# Patient Record
Sex: FEMALE | Race: WHITE | NOT HISPANIC OR LATINO | Employment: OTHER | URBAN - METROPOLITAN AREA
[De-identification: names, ages, dates, MRNs, and addresses within clinical notes are randomized per-mention and may not be internally consistent; named-entity substitution may affect disease eponyms.]

---

## 2017-01-19 ENCOUNTER — RECORDS - HEALTHEAST (OUTPATIENT)
Dept: LAB | Facility: CLINIC | Age: 77
End: 2017-01-19

## 2017-01-19 LAB
CHOLEST SERPL-MCNC: 271 MG/DL
FASTING STATUS PATIENT QL REPORTED: ABNORMAL
HDLC SERPL-MCNC: 82 MG/DL
LDLC SERPL CALC-MCNC: 179 MG/DL
TRIGL SERPL-MCNC: 50 MG/DL

## 2018-01-25 ENCOUNTER — RECORDS - HEALTHEAST (OUTPATIENT)
Dept: LAB | Facility: CLINIC | Age: 78
End: 2018-01-25

## 2018-01-25 LAB
CHOLEST SERPL-MCNC: 258 MG/DL
FASTING STATUS PATIENT QL REPORTED: ABNORMAL
HDLC SERPL-MCNC: 83 MG/DL
LDLC SERPL CALC-MCNC: 163 MG/DL
TRIGL SERPL-MCNC: 60 MG/DL

## 2018-01-26 LAB — 25(OH)D3 SERPL-MCNC: 58.6 NG/ML (ref 30–80)

## 2019-02-28 ENCOUNTER — RECORDS - HEALTHEAST (OUTPATIENT)
Dept: LAB | Facility: CLINIC | Age: 79
End: 2019-02-28

## 2019-02-28 LAB
CHOLEST SERPL-MCNC: 228 MG/DL
FASTING STATUS PATIENT QL REPORTED: ABNORMAL
HDLC SERPL-MCNC: 68 MG/DL
LDLC SERPL CALC-MCNC: 145 MG/DL
TRIGL SERPL-MCNC: 73 MG/DL

## 2019-03-01 LAB — 25(OH)D3 SERPL-MCNC: 58.6 NG/ML (ref 30–80)

## 2019-11-11 ENCOUNTER — RECORDS - HEALTHEAST (OUTPATIENT)
Dept: ADMINISTRATIVE | Facility: OTHER | Age: 79
End: 2019-11-11

## 2020-08-06 ENCOUNTER — RECORDS - HEALTHEAST (OUTPATIENT)
Dept: LAB | Facility: CLINIC | Age: 80
End: 2020-08-06

## 2020-08-07 LAB — 25(OH)D3 SERPL-MCNC: 49.1 NG/ML (ref 30–80)

## 2021-05-25 ENCOUNTER — RECORDS - HEALTHEAST (OUTPATIENT)
Dept: ADMINISTRATIVE | Facility: CLINIC | Age: 81
End: 2021-05-25

## 2021-05-26 ENCOUNTER — RECORDS - HEALTHEAST (OUTPATIENT)
Dept: ADMINISTRATIVE | Facility: CLINIC | Age: 81
End: 2021-05-26

## 2021-05-27 ENCOUNTER — RECORDS - HEALTHEAST (OUTPATIENT)
Dept: ADMINISTRATIVE | Facility: CLINIC | Age: 81
End: 2021-05-27

## 2021-05-28 ENCOUNTER — RECORDS - HEALTHEAST (OUTPATIENT)
Dept: ADMINISTRATIVE | Facility: CLINIC | Age: 81
End: 2021-05-28

## 2021-05-31 ENCOUNTER — RECORDS - HEALTHEAST (OUTPATIENT)
Dept: ADMINISTRATIVE | Facility: CLINIC | Age: 81
End: 2021-05-31

## 2021-06-01 ENCOUNTER — RECORDS - HEALTHEAST (OUTPATIENT)
Dept: ADMINISTRATIVE | Facility: CLINIC | Age: 81
End: 2021-06-01

## 2021-06-02 ENCOUNTER — RECORDS - HEALTHEAST (OUTPATIENT)
Dept: ADMINISTRATIVE | Facility: CLINIC | Age: 81
End: 2021-06-02

## 2021-07-13 ENCOUNTER — RECORDS - HEALTHEAST (OUTPATIENT)
Dept: ADMINISTRATIVE | Facility: CLINIC | Age: 81
End: 2021-07-13

## 2021-07-21 ENCOUNTER — RECORDS - HEALTHEAST (OUTPATIENT)
Dept: ADMINISTRATIVE | Facility: CLINIC | Age: 81
End: 2021-07-21

## 2021-07-26 ENCOUNTER — LAB REQUISITION (OUTPATIENT)
Dept: LAB | Facility: CLINIC | Age: 81
End: 2021-07-26
Payer: COMMERCIAL

## 2021-07-26 DIAGNOSIS — R05.9 COUGH: ICD-10-CM

## 2021-07-26 LAB — SARS-COV-2 RNA RESP QL NAA+PROBE: NEGATIVE

## 2021-07-26 PROCEDURE — U0003 INFECTIOUS AGENT DETECTION BY NUCLEIC ACID (DNA OR RNA); SEVERE ACUTE RESPIRATORY SYNDROME CORONAVIRUS 2 (SARS-COV-2) (CORONAVIRUS DISEASE [COVID-19]), AMPLIFIED PROBE TECHNIQUE, MAKING USE OF HIGH THROUGHPUT TECHNOLOGIES AS DESCRIBED BY CMS-2020-01-R: HCPCS | Mod: ORL | Performed by: PHYSICIAN ASSISTANT

## 2021-10-07 ENCOUNTER — LAB REQUISITION (OUTPATIENT)
Dept: LAB | Facility: CLINIC | Age: 81
End: 2021-10-07

## 2021-10-07 DIAGNOSIS — E55.9 VITAMIN D DEFICIENCY, UNSPECIFIED: ICD-10-CM

## 2021-10-07 PROCEDURE — 82306 VITAMIN D 25 HYDROXY: CPT | Performed by: FAMILY MEDICINE

## 2021-10-08 LAB — DEPRECATED CALCIDIOL+CALCIFEROL SERPL-MC: 61 UG/L (ref 30–80)

## 2022-02-03 ENCOUNTER — LAB REQUISITION (OUTPATIENT)
Dept: LAB | Facility: CLINIC | Age: 82
End: 2022-02-03
Payer: COMMERCIAL

## 2022-02-03 DIAGNOSIS — R09.81 NASAL CONGESTION: ICD-10-CM

## 2022-02-03 PROCEDURE — U0003 INFECTIOUS AGENT DETECTION BY NUCLEIC ACID (DNA OR RNA); SEVERE ACUTE RESPIRATORY SYNDROME CORONAVIRUS 2 (SARS-COV-2) (CORONAVIRUS DISEASE [COVID-19]), AMPLIFIED PROBE TECHNIQUE, MAKING USE OF HIGH THROUGHPUT TECHNOLOGIES AS DESCRIBED BY CMS-2020-01-R: HCPCS | Mod: ORL | Performed by: FAMILY MEDICINE

## 2022-02-04 LAB — SARS-COV-2 RNA RESP QL NAA+PROBE: NEGATIVE

## 2022-10-26 ENCOUNTER — LAB REQUISITION (OUTPATIENT)
Dept: LAB | Facility: CLINIC | Age: 82
End: 2022-10-26

## 2022-10-26 DIAGNOSIS — E55.9 VITAMIN D DEFICIENCY, UNSPECIFIED: ICD-10-CM

## 2022-10-26 PROCEDURE — 82306 VITAMIN D 25 HYDROXY: CPT | Performed by: FAMILY MEDICINE

## 2022-10-27 LAB — DEPRECATED CALCIDIOL+CALCIFEROL SERPL-MC: 91 UG/L (ref 20–75)

## 2022-12-01 ENCOUNTER — LAB REQUISITION (OUTPATIENT)
Dept: LAB | Facility: CLINIC | Age: 82
End: 2022-12-01

## 2022-12-01 DIAGNOSIS — E55.9 VITAMIN D DEFICIENCY, UNSPECIFIED: ICD-10-CM

## 2022-12-01 PROCEDURE — 82306 VITAMIN D 25 HYDROXY: CPT | Performed by: FAMILY MEDICINE

## 2022-12-02 LAB — DEPRECATED CALCIDIOL+CALCIFEROL SERPL-MC: 79 UG/L (ref 20–75)

## 2023-02-07 ENCOUNTER — LAB REQUISITION (OUTPATIENT)
Dept: LAB | Facility: CLINIC | Age: 83
End: 2023-02-07

## 2023-02-07 DIAGNOSIS — E78.2 MIXED HYPERLIPIDEMIA: ICD-10-CM

## 2023-02-07 LAB
ANION GAP SERPL CALCULATED.3IONS-SCNC: 13 MMOL/L (ref 7–15)
BUN SERPL-MCNC: 18.5 MG/DL (ref 8–23)
CALCIUM SERPL-MCNC: 9.4 MG/DL (ref 8.8–10.2)
CHLORIDE SERPL-SCNC: 102 MMOL/L (ref 98–107)
CHOLEST SERPL-MCNC: 266 MG/DL
CREAT SERPL-MCNC: 0.82 MG/DL (ref 0.51–0.95)
DEPRECATED HCO3 PLAS-SCNC: 25 MMOL/L (ref 22–29)
GFR SERPL CREATININE-BSD FRML MDRD: 72 ML/MIN/1.73M2
GLUCOSE SERPL-MCNC: 116 MG/DL (ref 70–99)
HDLC SERPL-MCNC: 86 MG/DL
LDLC SERPL CALC-MCNC: 153 MG/DL
NONHDLC SERPL-MCNC: 180 MG/DL
POTASSIUM SERPL-SCNC: 4.4 MMOL/L (ref 3.4–5.3)
SODIUM SERPL-SCNC: 140 MMOL/L (ref 136–145)
TRIGL SERPL-MCNC: 137 MG/DL

## 2023-02-07 PROCEDURE — 80061 LIPID PANEL: CPT | Performed by: PHYSICIAN ASSISTANT

## 2023-02-07 PROCEDURE — 80048 BASIC METABOLIC PNL TOTAL CA: CPT | Performed by: PHYSICIAN ASSISTANT

## 2023-02-15 ENCOUNTER — LAB REQUISITION (OUTPATIENT)
Dept: LAB | Facility: CLINIC | Age: 83
End: 2023-02-15

## 2023-02-15 DIAGNOSIS — E55.9 VITAMIN D DEFICIENCY, UNSPECIFIED: ICD-10-CM

## 2023-02-15 DIAGNOSIS — E78.2 MIXED HYPERLIPIDEMIA: ICD-10-CM

## 2023-02-15 LAB
ALBUMIN SERPL BCG-MCNC: 4.5 G/DL (ref 3.5–5.2)
ALP SERPL-CCNC: 85 U/L (ref 35–104)
ALT SERPL W P-5'-P-CCNC: 22 U/L (ref 10–35)
AST SERPL W P-5'-P-CCNC: 25 U/L (ref 10–35)
BILIRUB DIRECT SERPL-MCNC: <0.2 MG/DL (ref 0–0.3)
BILIRUB SERPL-MCNC: 0.3 MG/DL
PROT SERPL-MCNC: 6.8 G/DL (ref 6.4–8.3)

## 2023-02-15 PROCEDURE — 82306 VITAMIN D 25 HYDROXY: CPT | Performed by: PHYSICIAN ASSISTANT

## 2023-02-15 PROCEDURE — 80076 HEPATIC FUNCTION PANEL: CPT | Performed by: PHYSICIAN ASSISTANT

## 2023-02-16 LAB — DEPRECATED CALCIDIOL+CALCIFEROL SERPL-MC: 74 UG/L (ref 20–75)

## 2023-08-17 ENCOUNTER — TRANSFERRED RECORDS (OUTPATIENT)
Dept: HEALTH INFORMATION MANAGEMENT | Facility: CLINIC | Age: 83
End: 2023-08-17

## 2023-08-25 ENCOUNTER — TRANSFERRED RECORDS (OUTPATIENT)
Dept: HEALTH INFORMATION MANAGEMENT | Facility: CLINIC | Age: 83
End: 2023-08-25

## 2023-10-25 ENCOUNTER — TRANSFERRED RECORDS (OUTPATIENT)
Dept: HEALTH INFORMATION MANAGEMENT | Facility: CLINIC | Age: 83
End: 2023-10-25
Payer: COMMERCIAL

## 2023-10-31 ENCOUNTER — TRANSFERRED RECORDS (OUTPATIENT)
Dept: HEALTH INFORMATION MANAGEMENT | Facility: CLINIC | Age: 83
End: 2023-10-31
Payer: COMMERCIAL

## 2023-11-15 ENCOUNTER — MEDICAL CORRESPONDENCE (OUTPATIENT)
Dept: HEALTH INFORMATION MANAGEMENT | Facility: CLINIC | Age: 83
End: 2023-11-15
Payer: COMMERCIAL

## 2023-11-15 ENCOUNTER — TRANSCRIBE ORDERS (OUTPATIENT)
Dept: OTHER | Age: 83
End: 2023-11-15

## 2023-11-15 DIAGNOSIS — R05.3 CHRONIC COUGH: Primary | ICD-10-CM

## 2023-11-16 ENCOUNTER — TRANSCRIBE ORDERS (OUTPATIENT)
Dept: PULMONOLOGY | Facility: CLINIC | Age: 83
End: 2023-11-16
Payer: COMMERCIAL

## 2023-11-16 DIAGNOSIS — R05.3 CHRONIC COUGH: Primary | ICD-10-CM

## 2023-11-17 DIAGNOSIS — R05.3 CHRONIC COUGH: Primary | ICD-10-CM

## 2023-12-12 ENCOUNTER — OFFICE VISIT (OUTPATIENT)
Dept: PULMONOLOGY | Facility: CLINIC | Age: 83
End: 2023-12-12
Payer: COMMERCIAL

## 2023-12-12 VITALS
DIASTOLIC BLOOD PRESSURE: 78 MMHG | HEART RATE: 66 BPM | WEIGHT: 115 LBS | HEIGHT: 67 IN | SYSTOLIC BLOOD PRESSURE: 158 MMHG | BODY MASS INDEX: 18.05 KG/M2 | OXYGEN SATURATION: 99 %

## 2023-12-12 DIAGNOSIS — J84.89 ORGANIZING PNEUMONIA (H): Primary | ICD-10-CM

## 2023-12-12 DIAGNOSIS — R05.3 CHRONIC COUGH: ICD-10-CM

## 2023-12-12 PROCEDURE — 99204 OFFICE O/P NEW MOD 45 MIN: CPT | Performed by: INTERNAL MEDICINE

## 2023-12-12 PROCEDURE — 94729 DIFFUSING CAPACITY: CPT | Performed by: INTERNAL MEDICINE

## 2023-12-12 PROCEDURE — 94060 EVALUATION OF WHEEZING: CPT | Performed by: INTERNAL MEDICINE

## 2023-12-12 PROCEDURE — 94726 PLETHYSMOGRAPHY LUNG VOLUMES: CPT | Performed by: INTERNAL MEDICINE

## 2023-12-12 RX ORDER — SULFAMETHOXAZOLE/TRIMETHOPRIM 800-160 MG
1 TABLET ORAL
Qty: 30 TABLET | Refills: 0 | Status: SHIPPED | OUTPATIENT
Start: 2023-12-12 | End: 2024-01-09

## 2023-12-12 RX ORDER — PREDNISONE 20 MG/1
20 TABLET ORAL DAILY
Qty: 30 TABLET | Refills: 0 | Status: SHIPPED | OUTPATIENT
Start: 2024-01-13 | End: 2024-01-09

## 2023-12-12 RX ORDER — AMOXICILLIN 500 MG
1200 CAPSULE ORAL DAILY
COMMUNITY

## 2023-12-12 RX ORDER — PREDNISONE 20 MG/1
30 TABLET ORAL DAILY
Qty: 45 TABLET | Refills: 0 | Status: SHIPPED | OUTPATIENT
Start: 2023-12-13 | End: 2024-01-09

## 2023-12-12 NOTE — PATIENT INSTRUCTIONS
Prednisone 30 mg daily for 30 days.  Followed, by prednisone 20 mg daily for 30 days    Bactrim 1 tab every Monday, Wednesday, and Friday (while you are on prednisone)    Chest Xray before your next visit on 1/9/2023

## 2023-12-13 LAB
DLCOCOR-%PRED-PRE: 86 %
DLCOCOR-PRE: 16.86 ML/MIN/MMHG
DLCOUNC-%PRED-PRE: 88 %
DLCOUNC-PRE: 17.21 ML/MIN/MMHG
DLCOUNC-PRED: 19.48 ML/MIN/MMHG
ERV-%PRED-PRE: 113 %
ERV-PRE: 1.08 L
ERV-PRED: 0.95 L
EXPTIME-PRE: 8.5 SEC
FEF2575-%PRED-POST: 93 %
FEF2575-%PRED-PRE: 99 %
FEF2575-POST: 1.41 L/SEC
FEF2575-PRE: 1.51 L/SEC
FEF2575-PRED: 1.51 L/SEC
FEFMAX-%PRED-PRE: 134 %
FEFMAX-PRE: 6.81 L/SEC
FEFMAX-PRED: 5.05 L/SEC
FEV1-%PRED-PRE: 114 %
FEV1-PRE: 2.2 L
FEV1FEV6-PRE: 74 %
FEV1FEV6-PRED: 77 %
FEV1FVC-PRE: 73 %
FEV1FVC-PRED: 77 %
FEV1SVC-PRE: 75 %
FEV1SVC-PRED: 60 %
FIFMAX-PRE: 4.73 L/SEC
FRCPLETH-%PRED-PRE: 135 %
FRCPLETH-PRE: 3.89 L
FRCPLETH-PRED: 2.88 L
FVC-%PRED-PRE: 119 %
FVC-PRE: 3.01 L
FVC-PRED: 2.52 L
IC-%PRED-PRE: 96 %
IC-PRE: 1.83 L
IC-PRED: 1.9 L
RVPLETH-%PRED-PRE: 118 %
RVPLETH-PRE: 2.81 L
RVPLETH-PRED: 2.38 L
TLCPLETH-%PRED-PRE: 105 %
TLCPLETH-PRE: 5.72 L
TLCPLETH-PRED: 5.4 L
VA-%PRED-PRE: 100 %
VA-PRE: 4.85 L
VC-%PRED-PRE: 90 %
VC-PRE: 2.91 L
VC-PRED: 3.23 L

## 2024-01-06 ENCOUNTER — TELEPHONE (OUTPATIENT)
Dept: MULTI SPECIALTY CLINIC | Facility: CLINIC | Age: 84
End: 2024-01-06
Payer: COMMERCIAL

## 2024-01-06 NOTE — TELEPHONE ENCOUNTER
Ms. Rodriguez paged in to the pulmonary line with concerns about her medication and not feeling well. She has been taking prednisone since 12/14 and isn't feeling better. She feels more fatigued in general and is gaining weight. She woke up at 0400 with severe pounding in her chest. Her BP at the time was 114/54 and pulse was in the 80s. These are usual for her. Currently the pounding is better but still present. She feels a a little dizzy. She says she could be dehydrated, she doesn't drink much water. No chest pain, no shortness of breath, no cough, no fevers, no vomiting. She has chronic loose stools that are at her baseline. Appetite is okay. She says she can drink electrolyte solution/soup broth/increase food and liquids today and monitor her symptoms. I asked that if she feels the pounding again she check her BP and pulse like she did and asked her to call or seek help if she is feeling worse. In terms of the prednisone she is currently taking 20 mg daily and very interested in stopping. She has a follow up with her pulmonologist on 1/9 with repeat imaging. I recommended she continue the dose until follow up which she agrees to.     Allyssa Reed   Pulmonary/Critical Care Fellow

## 2024-01-09 ENCOUNTER — OFFICE VISIT (OUTPATIENT)
Dept: PULMONOLOGY | Facility: CLINIC | Age: 84
End: 2024-01-09
Attending: INTERNAL MEDICINE
Payer: COMMERCIAL

## 2024-01-09 ENCOUNTER — HOSPITAL ENCOUNTER (OUTPATIENT)
Dept: GENERAL RADIOLOGY | Facility: HOSPITAL | Age: 84
Discharge: HOME | End: 2024-01-09
Attending: INTERNAL MEDICINE | Admitting: INTERNAL MEDICINE
Payer: COMMERCIAL

## 2024-01-09 VITALS
TEMPERATURE: 97.7 F | DIASTOLIC BLOOD PRESSURE: 72 MMHG | BODY MASS INDEX: 18.78 KG/M2 | SYSTOLIC BLOOD PRESSURE: 148 MMHG | WEIGHT: 119 LBS | OXYGEN SATURATION: 97 % | HEART RATE: 70 BPM

## 2024-01-09 DIAGNOSIS — J84.89 ORGANIZING PNEUMONIA (H): ICD-10-CM

## 2024-01-09 DIAGNOSIS — R91.8 PULMONARY INFILTRATES: Primary | ICD-10-CM

## 2024-01-09 PROCEDURE — 71046 X-RAY EXAM CHEST 2 VIEWS: CPT

## 2024-01-09 PROCEDURE — 99213 OFFICE O/P EST LOW 20 MIN: CPT | Performed by: INTERNAL MEDICINE

## 2024-01-09 RX ORDER — PREDNISONE 10 MG/1
10 TABLET ORAL DAILY
Qty: 30 TABLET | Refills: 0 | Status: SHIPPED | OUTPATIENT
Start: 2024-01-09 | End: 2024-05-31

## 2024-01-09 NOTE — PROGRESS NOTES
Clarified orders per Dr. Andrade's notes. Discontinued Bactrim and all other prednisone orders except the prednisone 10 mg x 30 days. Spoke with Janay and clarified the correct dose/meds.    Is This A New Presentation, Or A Follow-Up?: Skin Lesion

## 2024-01-09 NOTE — PROGRESS NOTES
Pulmonary Clinic follow-up note  Date of Service: 2024    Reason for Consultation: Cough    History:     HPI: Patient is a pleasant 83-year-old female, former smoker, with history of lung nodule who was referred here for evaluation of cough and abnormal CT scan of the chest.  Patient has been having a cough for some time.  She was tested for COVID and flu and these were negative.  She ended up undergoing a chest x-ray on 2023 that was suggestive of multifocal pneumonia.  There was also left pleural effusion and trace right pleural effusion.  She was treated with Augmentin and azithromycin however cough persisted.  Patient had another course of antibiotics but her radiographic findings of infiltrates persisted. Patient was then referred here for further evaluation.    Patient notes that her cough is nonproductive.  She denies any weight loss.  She has occasional night sweats.  She denies any wheezing.    Interval history:  Patient is here for her scheduled follow-up visit.  She notes that she did not take prednisone 30 mg daily but instead, she started taking 20 mg daily.  She did not know that she had to take 30 mg daily.  She took Bactrim while she was on prednisone 240 mg daily.  She completed 1 month.  She had a chest x-ray this morning.  She denies any cough or shortness of breath.  She notes that she is able to walk her dog.  She denies any night sweats or weight loss.  She feels that she is at baseline and would like to get off steroids.    PMHx/PSHx:  H/o breast cancer s/p lumpectomy with axillary lymph node dissection in  s/p chemo and XT  Allergic rhinitis  Hyperlipidemia  Vitamin D deficiency  Benign pulmonary lesion  Plantar fasciitis  Tonsillectomy  Cataract surgery  Left knee arthroscopy  Tubal ligation    Family history: Father  at the age of 66.  He had coronary disease and underwent CABG.  He also had esophagus cancer.  Mother  at the age of 99 and had colon cancer.    Social Hx:  "  Tobacco: smoked only 1 year in her life.  Occupational exposures: used to work at [employer]. Was an .  Travel: no recent travel. Was in [place], played in softball tournament, on mid-October of this year (2023) for 4 days.  Pets: takes care of a dog but does not own it.    Review of Systems - 10 point review of system negative except for what is mentioned in the HPI.    Exam/Data:   BP (!) 148/72 (BP Location: Right arm, Patient Position: Chair, Cuff Size: Adult Small)   Pulse 70   Temp 97.7  F (36.5  C) (Oral)   Wt 54 kg (119 lb)   SpO2 97%   BMI 18.78 kg/m      EXAM:  GEN: comfortable, NAD  HEENT: NCAT, EMOI  CVS: S1S2, RRR  Lung: CTA bilaterally  Abd: soft, nt, + BS. No masses  Ext: no c/c/e  Neuro: nonfocal  Skin: no visible rash  Musculoskeletal: FROM all extremities  Psych: appropriate    DATA    Labs: reviewed in EMR and outside records where pertinent.     PFT DATA on 2023: normal PFT\"s.     CT chest 2015:  CONCLUSION:   1. No appreciable change in the focal micronodular opacities in the right lateral costophrenic angle. This is superimposed over areas of focal bronchiectasis bilaterally with minimal peribronchial cuffing as noted above and pulmonary nodules which are stable.     CT chest 8/25/2023:  NO PE  Scattered patchy areas of airspace consolidation involving all lobes suggestive of areas of multifocal PNA. There are superimposed areas of bronchiolitis in the periphery of both lungs worst in the lung bases. There is a small left pleural effusion and trace right pleural effusion.    CT chest on 10/31/2023  -Interval increased RLL peribronchovascular consolidative opacity and centrilobular nodules which my reflect infectious / inflammatory process. Findings can be seen in organizing pneumonia  -Interval decreased scattered other areas of multifocal patchy opacities seen on the piror exam  -Similar consolidative opacities in the RUL, RML, and lingula  -Stable prominent mediastina LN " which my be reactive.     Assessment/Plan:   Janay Rodriguez is a 83 year old female, non-smoker, with history of breast cancer status postlumpectomy, chemo and radiation back in 1984, and history of pneumonia back on August 2023 for which patient received 2 courses of antibiotics.  Imaging studies revealed bilateral patchy opacities.  This was done on a CT scan done on 8/25/2023.  This was followed by another CT scan of the chest to ensure resolution that was done on 10/31/2023.  Her latest chest CT scan of the chest done on 10/31/2023 showed interval decrease in some areas of multifocal patchy opacities whereas others increased.  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  Malignancy is less likely given that patient is a non-smoker.    Recommendations:  -Prednisone 20 mg daily for 30 days.  We will start her now on 10 mg daily for 30 days   -Can stop taking Bactrim DS Monday Wednesday and Friday Will need to prescribe  -CT scan of the chest      Sandra Andrade MD  Pulmonary and Critical Care Medicine    No family history on file.  Allergies   Allergen Reactions    Other Environmental Allergy Unknown     Seasonal        Medications:     Current Outpatient Medications   Medication Sig Dispense Refill    [START ON 1/13/2024] predniSONE (DELTASONE) 20 MG tablet Take 1 tablet (20 mg) by mouth daily for 30 days 30 tablet 0    sulfamethoxazole-trimethoprim (BACTRIM DS) 800-160 MG tablet Take 1 tablet by mouth three times a week 30 tablet 0    Calcium Carb-Cholecalciferol (CALCIUM 600+D3 PO) Take by mouth daily (Patient not taking: Reported on 1/9/2024)      MAGNESIUM PO Take 400 mg by mouth daily (Patient not taking: Reported on 1/9/2024)      Multiple Vitamins-Minerals (CENTRUM SILVER PO) Take by mouth daily (Patient not taking: Reported on 1/9/2024)      Multiple Vitamins-Minerals (PRESERVISION AREDS 2 PO) Take by mouth daily (Patient not taking: Reported on 1/9/2024)      Omega-3 Fatty Acids  (FISH OIL) 1200 MG capsule Take 1,200 mg by mouth daily (Patient not taking: Reported on 1/9/2024)      predniSONE (DELTASONE) 20 MG tablet Take 1.5 tablets (30 mg) by mouth daily for 30 days (Patient not taking: Reported on 1/9/2024) 45 tablet 0    TURMERIC PO Take by mouth daily (Patient not taking: Reported on 1/9/2024)         Much or all of the text in this note was generated through the use of the Dragon Dictate voice-to-text software. Errors in spelling or words which seem out of context are unintentional. Sound alike errors, in particular, may have escaped editing.

## 2024-01-31 ENCOUNTER — TELEPHONE (OUTPATIENT)
Dept: PULMONOLOGY | Facility: CLINIC | Age: 84
End: 2024-01-31
Payer: COMMERCIAL

## 2024-01-31 NOTE — TELEPHONE ENCOUNTER
[facility] Call Center    Phone Message    May a detailed message be left on voicemail: yes     Reason for Call: Symptoms or Concerns     If patient has red-flag symptoms, warm transfer to triage line    Current symptom or concern: Caller wants to know if she can not finish last 7 days of -predniSONE (DELTASONE) 10 MG tablet  due to side effects , dizziness, fatigue , over feeling bad.     Symptoms have been present for:  2 week(s)        Action Taken: Other: Pulm     Travel Screening: Not Applicable

## 2024-01-31 NOTE — TELEPHONE ENCOUNTER
Spoke with Janay. She reports not feeling herself since starting the prednisone. She has been on it 20mg for 30 days then 10mg for almost 30 days (has a week left).  She has not been taking her vitamins since starting the prednisone as well. She feels fatigue, dizziness at times, and generally not having as much energy. Her breathing symptoms are doing well. Recommended her to start her vitamins again. If she is not tolerating the prednisone, she may cut them in half and taper to 5mg. She will try this and call the nurse line back to update how it is going.

## 2024-02-09 ENCOUNTER — HOSPITAL ENCOUNTER (OUTPATIENT)
Dept: CT IMAGING | Facility: HOSPITAL | Age: 84
Discharge: HOME | End: 2024-02-09
Attending: INTERNAL MEDICINE | Admitting: INTERNAL MEDICINE
Payer: COMMERCIAL

## 2024-02-09 DIAGNOSIS — R91.8 PULMONARY INFILTRATES: ICD-10-CM

## 2024-02-09 PROCEDURE — 71250 CT THORAX DX C-: CPT

## 2024-02-27 ENCOUNTER — DOCUMENTATION ONLY (OUTPATIENT)
Dept: PULMONOLOGY | Facility: CLINIC | Age: 84
End: 2024-02-27

## 2024-02-27 ENCOUNTER — VIRTUAL VISIT (OUTPATIENT)
Dept: PULMONOLOGY | Facility: CLINIC | Age: 84
End: 2024-02-27
Attending: INTERNAL MEDICINE
Payer: COMMERCIAL

## 2024-02-27 VITALS — WEIGHT: 122 LBS | BODY MASS INDEX: 18.49 KG/M2 | HEIGHT: 68 IN

## 2024-02-27 DIAGNOSIS — R13.10 DYSPHAGIA, UNSPECIFIED TYPE: ICD-10-CM

## 2024-02-27 DIAGNOSIS — J47.9 BRONCHIECTASIS WITHOUT ACUTE EXACERBATION (H): Primary | ICD-10-CM

## 2024-02-27 PROCEDURE — 99442 PR PHYSICIAN TELEPHONE EVALUATION 11-20 MIN: CPT | Mod: 93 | Performed by: INTERNAL MEDICINE

## 2024-02-27 ASSESSMENT — PAIN SCALES - GENERAL: PAINLEVEL: NO PAIN (0)

## 2024-02-27 NOTE — PROGRESS NOTES
Virtual Visit Details    Type of service:  Telephone Visit   Phone call duration: 15 minutes   Originating Location (pt. Location): Home    Distant Location (provider location):  On-site    Pulmonary Clinic follow-up note  Date of Service: 2024    Reason for Consultation: Cough    History:     HPI: Patient is a pleasant 83-year-old female, former smoker, with history of lung nodule who was referred here for evaluation of cough and abnormal CT scan of the chest.  Patient has been having a cough for some time.  She was tested for COVID and flu and these were negative.  She ended up undergoing a chest x-ray on 2023 that was suggestive of multifocal pneumonia.  There was also left pleural effusion and trace right pleural effusion.  She was treated with Augmentin and azithromycin however cough persisted.  Patient had another course of antibiotics but her radiographic findings of infiltrates persisted. Patient was then referred here for further evaluation.    Interval history:  Patient was called for virtual visit this morning.  She denies any cough, shortness of breath, night sweats, weight loss, rashes, joint aches.  He had been on prednisone and completed her course of 10 mg.  Of note, patient presented to us with CT scan showing bilateral multifocal opacities.  She had completed several courses of antibiotics prior to that. Discussions where held with pt and did not want to undergo bronchoscopy. She was apparently treated with a prednisone taper presumed to cryptogenic organizing pneumonia.  Follow-up CT scan of the chest showed resolution of pulmonary opacities.    PMHx/PSHx:  H/o breast cancer s/p lumpectomy with axillary lymph node dissection in  s/p chemo and XT  Allergic rhinitis  Hyperlipidemia  Vitamin D deficiency  Benign pulmonary lesion  Plantar fasciitis  Tonsillectomy  Cataract surgery  Left knee arthroscopy  Tubal ligation    Family history: Father  at the age of 66.  He had coronary  "disease and underwent CABG.  He also had esophagus cancer.  Mother  at the age of 99 and had colon cancer.    Social Hx:   Tobacco: smoked only 1 year in her life.  Occupational exposures: used to work at [employer]. Was an .  Travel: no recent travel. Was in [place], played in [place], on mid-October of this year () for 4 days.  Pets: takes care of a dog but does not own it.    Review of Systems - 10 point review of system negative except for what is mentioned in the HPI.    Exam/Data:   Ht 1.721 m (5' 7.75\")   Wt 55.3 kg (122 lb)   BMI 18.69 kg/m      EXAM:      DATA    Labs: reviewed in EMR and outside records where pertinent.     PFT DATA on : normal PFT\"s.     CT chest :  CONCLUSION:   1. No appreciable change in the focal micronodular opacities in the right lateral costophrenic angle. This is superimposed over areas of focal bronchiectasis bilaterally with minimal peribronchial cuffing as noted above and pulmonary nodules which are stable.     CT chest 2023:  NO PE  Scattered patchy areas of airspace consolidation involving all lobes suggestive of areas of multifocal PNA. There are superimposed areas of bronchiolitis in the periphery of both lungs worst in the lung bases. There is a small left pleural effusion and trace right pleural effusion.    CT chest on 10/31/2023  -Interval increased RLL peribronchovascular consolidative opacity and centrilobular nodules which my reflect infectious / inflammatory process. Findings can be seen in organizing pneumonia  -Interval decreased scattered other areas of multifocal patchy opacities seen on the piror exam  -Similar consolidative opacities in the RUL, RML, and lingula  -Stable prominent mediastina LN which my be reactive.     CT chest 2024:  IMPRESSION:   1.  Marked improvement in the consolidating opacities in the right middle lobe, lingula and right lower lobe as noted above.  2.  Bronchiectasis, predominantly " cylindrical with more focal cystic changes in the right lower lobe again noted. Scattered, mild areas of retained endobronchial secretions noted in both costophrenic angles posterolaterally, right greater than left.   Retained secretions  are not hyperdense and distribution not typical for allergic bronchopulmonary aspergillosis, but this should be considered.  3.  Pulmonary findings have clearly progressed since 2015.    Assessment/Plan:   Janay Rodriguez is a 83 year old female, non-smoker, with history of breast cancer status postlumpectomy, chemo and radiation back in 1984, and history of pneumonia back on August 2023 for which patient received 2 courses of antibiotics.  Imaging studies revealed bilateral patchy opacities.  This was done on a CT scan done on 8/25/2023.  This was followed by another CT scan of the chest to ensure resolution that was done on 10/31/2023.  Her latest chest CT scan of the chest done on 10/31/2023 showed interval decrease in some areas of multifocal patchy opacities whereas others increased.  Etiology is likely secondary to cryptogenic organizing pneumonia as malignancy, the latter being much less likely and a non-smoker.      Recommendations:  -Completed several months of prednisone taper.  Follow-up CT scan of the chest showed resolution of multifocal patchy opacities   -We will send for a swallow evaluation to ensure no aspiration given CT chest findings.  -Repeat chest x-ray  -Will send sputum for AFB x 3 and Gram stain culture given bronchiectasis    Follow up in 3 months.    Sandra Andrade MD  Pulmonary and Critical Care Medicine    No family history on file.  Allergies   Allergen Reactions    Other Environmental Allergy Unknown     Seasonal        Medications:     Current Outpatient Medications   Medication Sig Dispense Refill    Calcium Carb-Cholecalciferol (CALCIUM 600+D3 PO) Take by mouth daily (Patient not taking: Reported on 1/9/2024)      MAGNESIUM PO Take 400 mg by mouth  daily (Patient not taking: Reported on 1/9/2024)      Multiple Vitamins-Minerals (CENTRUM SILVER PO) Take by mouth daily (Patient not taking: Reported on 1/9/2024)      Multiple Vitamins-Minerals (PRESERVISION AREDS 2 PO) Take by mouth daily (Patient not taking: Reported on 1/9/2024)      Omega-3 Fatty Acids (FISH OIL) 1200 MG capsule Take 1,200 mg by mouth daily (Patient not taking: Reported on 1/9/2024)      predniSONE (DELTASONE) 10 MG tablet Take 1 tablet (10 mg) by mouth daily (Patient not taking: Reported on 2/27/2024) 30 tablet 0    TURMERIC PO Take by mouth daily (Patient not taking: Reported on 1/9/2024)         Much or all of the text in this note was generated through the use of the Dragon Dictate voice-to-text software. Errors in spelling or words which seem out of context are unintentional. Sound alike errors, in particular, may have escaped editing.

## 2024-02-27 NOTE — NURSING NOTE
Is the patient currently in the state of [place]? YES    Visit mode:TELEPHONE    If the visit is dropped, the patient can be reconnected by: TELEPHONE VISIT: Phone number:   No relevant phone numbers on file.       Will anyone else be joining the visit? NO  (If patient encounters technical issues they should call [phone]    How would you like to obtain your AVS? MyChart    Are changes needed to the allergy or medication list? Pt stated no changes to allergies and Pt stated no med changes    Reason for visit: ORIN SAUNDERS

## 2024-02-27 NOTE — PROGRESS NOTES
Mailed 4 sputum collection cups along with directions for collection, labels and plastic lab bags to address listed in chart per Dr. Andrade's orders.

## 2024-03-01 ENCOUNTER — TELEPHONE (OUTPATIENT)
Dept: PULMONOLOGY | Facility: CLINIC | Age: 84
End: 2024-03-01
Payer: COMMERCIAL

## 2024-03-01 NOTE — TELEPHONE ENCOUNTER
Janay called for clarification of sputum collection. Instructed her to collect 4 samples, 2 of which can be on the same day. The other 2 should be collected on different days. She informs having a really hard time getting any phlegm up but she will try. She will turn in these samples to [facility] when done. Instruction sheet was included with sample cups that were mailed to her. Gave her the nurse line number to call if she has further questions.

## 2024-03-02 ENCOUNTER — LAB (OUTPATIENT)
Dept: LAB | Facility: HOSPITAL | Age: 84
End: 2024-03-02
Payer: COMMERCIAL

## 2024-03-02 DIAGNOSIS — J47.9 BRONCHIECTASIS WITHOUT ACUTE EXACERBATION (H): ICD-10-CM

## 2024-03-02 LAB
BACTERIA SPT CULT: NORMAL
GRAM STAIN RESULT: NORMAL

## 2024-03-02 PROCEDURE — 87205 SMEAR GRAM STAIN: CPT

## 2024-03-27 ENCOUNTER — HOSPITAL ENCOUNTER (OUTPATIENT)
Dept: SPEECH THERAPY | Facility: HOSPITAL | Age: 84
Discharge: HOME | End: 2024-03-27
Attending: INTERNAL MEDICINE
Payer: COMMERCIAL

## 2024-03-27 ENCOUNTER — HOSPITAL ENCOUNTER (OUTPATIENT)
Dept: RADIOLOGY | Facility: HOSPITAL | Age: 84
Discharge: HOME | End: 2024-03-27
Attending: INTERNAL MEDICINE
Payer: COMMERCIAL

## 2024-03-27 DIAGNOSIS — R13.10 DYSPHAGIA, UNSPECIFIED TYPE: ICD-10-CM

## 2024-03-27 DIAGNOSIS — J47.9 BRONCHIECTASIS WITHOUT ACUTE EXACERBATION (H): ICD-10-CM

## 2024-03-27 PROCEDURE — 92610 EVALUATE SWALLOWING FUNCTION: CPT | Mod: GN

## 2024-03-27 PROCEDURE — 92611 MOTION FLUOROSCOPY/SWALLOW: CPT | Mod: GN

## 2024-03-27 PROCEDURE — 74230 X-RAY XM SWLNG FUNCJ C+: CPT

## 2024-03-27 PROCEDURE — 71046 X-RAY EXAM CHEST 2 VIEWS: CPT

## 2024-03-27 NOTE — PROGRESS NOTES
"SPEECH LANGUAGE PATHOLOGY EVALUATION    See electronic medical record for Abuse and Falls Screening details.    Subjective   Presenting condition or subjective complaint:  Persistent cough  Relevant medical history:   Per ordering provider on 2/27/24 \"Janay Rodriguez is a 83 year old female, non-smoker, with history of breast cancer status postlumpectomy, chemo and radiation back in 1984, and history of pneumonia back on August 2023 for which patient received 2 courses of antibiotics.  Imaging studies revealed bilateral patchy opacities.  This was done on a CT scan done on 8/25/2023.  This was followed by another CT scan of the chest to ensure resolution that was done on 10/31/2023.  Her latest chest CT scan of the chest done on 10/31/2023 showed interval decrease in some areas of multifocal patchy opacities whereas others increased.  Etiology is likely secondary to cryptogenic organizing pneumonia as malignancy, the latter being much less likely and a non-smoker.       Recommendations:  -Completed several months of prednisone taper.  Follow-up CT scan of the chest showed resolution of multifocal patchy opacities   -We will send for a swallow evaluation to ensure no aspiration given CT chest findings.  -Repeat chest x-ray  -Will send sputum for AFB x 3 and Gram stain culture given bronchiectasis     Follow up in 3 months.\"    Prior therapy history for the same diagnosis, illness or injury:    n/a       Objective     SWALLOW EVALUATION  Dysphagia history: Patient denies difficulty swallowing. She stated that she does not have difficulty with reflux but will take a tums on occasion if needed. Chronic cough and throat clearing. Difficulty coughing up phlegm. Primary reason for evaluations listed above.  Current Diet/Method of Nutritional Intake: thin liquids (level 0), regular diet        CLINICAL SWALLOW EVALUATION  Oral Motor Function: Dentition: adequate dentition  Secretion management: WFL  Mucosal quality: " good  Mandibular function: intact  Oral labial function: WFL  Lingual function: WFL  Velar function: intact   Buccal function: intact  Laryngeal function: throat clear, volitional swallow, voicing WFL, Hx of bowed vocal folds (2022).      Level of assist required for feeding: no assistance needed     Clinical Swallow Evaluation completed prior to VFSS via clinical interview, EMR review, and oral motor examination. Oral motor function is WFL.       VIDEOFLUOROSCOPIC SWALLOW STUDY  Radiologist: Dr. Orellana  Views Taken: left lateral, A/P   [facility]   VFSS Eval: Thin Liquids  Mode of Presentation: cup, straw   Order of Presentation: 1,2,3,5  Preparatory Phase: WFL  Oral Phase: WFL  Bolus Location When Swallow Initiated: posterior angle of ramus  Pharyngeal Phase: impaired tongue base retraction, trace diffuse stasis with larger and consecutive sips. Clears independently with spontaneous subsequent swallow.  Rosenbeck's Penetration Aspiration Scale: 1 - no aspiration, contrast does not enter airway    VFSS Eval: Solids  Mode of Presentation: spoon   Order of Presentation: 4  Preparatory Phase: WFL  Oral Phase: WFL  Bolus Location When Swallow Initiated: posterior angle of ramus  Pharyngeal Phase: WFL   Rosenbeck s Penetration Aspiration Scale: 1 - no aspiration, contrast does not enter airway    ESOPHAGEAL PHASE OF SWALLOW  esophageal sweep performed during today's videofluoroscopic exam  please refer to radiologist's report for details     SWALLOW ASSESSMENT CLINICAL IMPRESSIONS AND RATIONALE  Diet Consistency Recommendations: thin liquids (level 0), regular diet      Assessment & Plan   CLINICAL IMPRESSIONS   Impression/Assessment: Video Swallow Study completed. Patient had no aspiration or penetration. Oropharyngeal swallow function is WNL. Mildly reduced BOT strength resulting in trace diffuse stasis with larger and consecutive sips of thin. This clears  with spontaneous subsequent swallow. Pharyngeal constriction, hyolaryngeal elevation and excursion were all WNL. Epiglottic inversion is complete. Swallow response is timely. Mastication is safe and complete. Cricopharyngeal function is WFL.  Recommend Regular diet and Thin liquids.     PLAN OF CARE    Recommended Referrals to Other Professionals: n/a  Education Assessment:   Learner/Method: Patient;Listening;No Barriers to Learning    Risks and benefits of evaluation/treatment have been explained.   Patient/Family/caregiver agrees with Plan of Care.     Evaluation Time:    SLP Eval: oral/pharyngeal swallow function, clinical minutes (50501): 15  SLP Eval: VideoFluoroscopic Swallow function Minutes (29313): 10  Signing Clinician: CAMELIA Thurman    [facility]                                                                                   OUTPATIENT SPEECH LANGUAGE PATHOLOGY

## 2024-05-08 ENCOUNTER — LAB REQUISITION (OUTPATIENT)
Dept: LAB | Facility: CLINIC | Age: 84
End: 2024-05-08

## 2024-05-08 DIAGNOSIS — Z13.1 ENCOUNTER FOR SCREENING FOR DIABETES MELLITUS: ICD-10-CM

## 2024-05-08 DIAGNOSIS — E78.2 MIXED HYPERLIPIDEMIA: ICD-10-CM

## 2024-05-08 DIAGNOSIS — E55.9 VITAMIN D DEFICIENCY, UNSPECIFIED: ICD-10-CM

## 2024-05-08 LAB — HBA1C MFR BLD: 5.5 %

## 2024-05-08 PROCEDURE — 80053 COMPREHEN METABOLIC PANEL: CPT | Performed by: PHYSICIAN ASSISTANT

## 2024-05-08 PROCEDURE — 82306 VITAMIN D 25 HYDROXY: CPT | Performed by: PHYSICIAN ASSISTANT

## 2024-05-08 PROCEDURE — 83036 HEMOGLOBIN GLYCOSYLATED A1C: CPT | Performed by: PHYSICIAN ASSISTANT

## 2024-05-08 PROCEDURE — 80061 LIPID PANEL: CPT | Performed by: PHYSICIAN ASSISTANT

## 2024-05-09 LAB
ALBUMIN SERPL BCG-MCNC: 4.6 G/DL (ref 3.5–5.2)
ALP SERPL-CCNC: 88 U/L (ref 40–150)
ALT SERPL W P-5'-P-CCNC: 16 U/L (ref 0–50)
ANION GAP SERPL CALCULATED.3IONS-SCNC: 13 MMOL/L (ref 7–15)
AST SERPL W P-5'-P-CCNC: 21 U/L (ref 0–45)
BILIRUB SERPL-MCNC: 0.3 MG/DL
BUN SERPL-MCNC: 17.5 MG/DL (ref 8–23)
CALCIUM SERPL-MCNC: 10 MG/DL (ref 8.8–10.2)
CHLORIDE SERPL-SCNC: 103 MMOL/L (ref 98–107)
CHOLEST SERPL-MCNC: 240 MG/DL
CREAT SERPL-MCNC: 0.76 MG/DL (ref 0.51–0.95)
DEPRECATED HCO3 PLAS-SCNC: 26 MMOL/L (ref 22–29)
EGFRCR SERPLBLD CKD-EPI 2021: 78 ML/MIN/1.73M2
FASTING STATUS PATIENT QL REPORTED: ABNORMAL
FASTING STATUS PATIENT QL REPORTED: ABNORMAL
GLUCOSE SERPL-MCNC: 116 MG/DL (ref 70–99)
HDLC SERPL-MCNC: 96 MG/DL
LDLC SERPL CALC-MCNC: 132 MG/DL
NONHDLC SERPL-MCNC: 144 MG/DL
POTASSIUM SERPL-SCNC: 4.7 MMOL/L (ref 3.4–5.3)
PROT SERPL-MCNC: 7.1 G/DL (ref 6.4–8.3)
SODIUM SERPL-SCNC: 142 MMOL/L (ref 135–145)
TRIGL SERPL-MCNC: 59 MG/DL
VIT D+METAB SERPL-MCNC: 57 NG/ML (ref 20–50)

## 2024-05-24 ENCOUNTER — TELEPHONE (OUTPATIENT)
Dept: PULMONOLOGY | Facility: CLINIC | Age: 84
End: 2024-05-24
Payer: COMMERCIAL

## 2024-05-24 NOTE — TELEPHONE ENCOUNTER
Janay called requesting the address to our clinic. Returned call but reached voice mail. Left vm message with our clinic address and gave her nurse line number to call if she has further questions.

## 2024-05-31 ENCOUNTER — OFFICE VISIT (OUTPATIENT)
Dept: PULMONOLOGY | Facility: CLINIC | Age: 84
End: 2024-05-31
Payer: COMMERCIAL

## 2024-05-31 ENCOUNTER — TELEPHONE (OUTPATIENT)
Dept: PULMONOLOGY | Facility: CLINIC | Age: 84
End: 2024-05-31

## 2024-05-31 VITALS
HEART RATE: 78 BPM | SYSTOLIC BLOOD PRESSURE: 130 MMHG | BODY MASS INDEX: 18.84 KG/M2 | WEIGHT: 123 LBS | DIASTOLIC BLOOD PRESSURE: 72 MMHG | OXYGEN SATURATION: 95 %

## 2024-05-31 DIAGNOSIS — R05.3 CHRONIC COUGH: ICD-10-CM

## 2024-05-31 DIAGNOSIS — R91.8 PULMONARY INFILTRATES: ICD-10-CM

## 2024-05-31 DIAGNOSIS — J47.9 BRONCHIECTASIS WITHOUT ACUTE EXACERBATION (H): Primary | ICD-10-CM

## 2024-05-31 PROCEDURE — 99214 OFFICE O/P EST MOD 30 MIN: CPT | Performed by: INTERNAL MEDICINE

## 2024-05-31 NOTE — PATIENT INSTRUCTIONS
Please remember to use your sinus washes at least once to twice a day especially when you are having increased nasal discharge. We would also like to advise you not to use tap or bottle water for your sinus washes and only use distilled or boiled water for this purpose.

## 2024-05-31 NOTE — PROGRESS NOTES
Pulmonary Clinic follow-up note  Date of Service: 2024    CCx: Follow-up of cough    History:     HPI: Ms. Rodriguez is a 84 year old lady, former smoker, with history of lung nodule who was referred here for evaluation of cough and abnormal CT scan of the chest. Since her last clinic visit, she has undergone a swallow evaluation which has essentially been unremarkable and states that she is extremely active and feeling very well.      Pertient Medical History:   has been having a cough for some time.  She was tested for COVID and flu and these were negative.  She underwent a chest x-ray on 2023 that was suggestive of multifocal pneumonia.  There was also left pleural effusion and trace right pleural effusion.  She was treated with Augmentin and azithromycin however cough persisted.  Patient had another course of antibiotics but her radiographic findings of infiltrates persisted. Patient was then referred here for further evaluation.  At the last clinic visit she had been offered to undergo bronchoscopy given that she completed a prednisone taper for presumed cryptogenic organizing pneumonia but declined this.    PMHx/PSHx:  H/o breast cancer s/p lumpectomy with axillary lymph node dissection in  s/p chemo and XT  Allergic rhinitis  Hyperlipidemia  Vitamin D deficiency  Benign pulmonary lesion  Plantar fasciitis  Tonsillectomy  Cataract surgery  Left knee arthroscopy  Tubal ligation    Family history: Father  at the age of 66.  He had coronary disease and underwent CABG.  He also had esophagus cancer.  Mother  at the age of 99 and had colon cancer.    Social Hx:   Tobacco: smoked only 1 year in her life.  Occupational exposures: used to work at [employer]. Was an .  Travel: no recent travel. Was in [place], [place] [employer] [employer] on mid-October of this year () for 4 days.  Pets: takes care of a dog but does not own it.    Review of Systems - 10 point review of system  "negative except for what is mentioned in the HPI.    Medications:  Current Outpatient Medications   Medication Sig Dispense Refill    Calcium Carb-Cholecalciferol (CALCIUM 600+D3 PO) Take by mouth daily      MAGNESIUM PO Take 400 mg by mouth daily      Multiple Vitamins-Minerals (CENTRUM SILVER PO) Take by mouth daily      Multiple Vitamins-Minerals (PRESERVISION AREDS 2 PO) Take by mouth daily      Omega-3 Fatty Acids (FISH OIL) 1200 MG capsule Take 1,200 mg by mouth daily      TURMERIC PO Take by mouth daily      predniSONE (DELTASONE) 10 MG tablet Take 1 tablet (10 mg) by mouth daily (Patient not taking: Reported on 2/27/2024) 30 tablet 0       Exam/Data:   /72 (BP Location: Right arm, Patient Position: Chair, Cuff Size: Adult Regular)   Pulse 78   Wt 55.8 kg (123 lb)   SpO2 95%   BMI 18.84 kg/m      EXAM:  Physical Exam  HENT:      Head: Normocephalic.   Cardiovascular:      Heart sounds: Normal heart sounds.   Pulmonary:      Effort: Pulmonary effort is normal.      Breath sounds: Normal breath sounds.   Abdominal:      General: Abdomen is flat.      Palpations: Abdomen is soft.   Skin:     General: Skin is warm.   Neurological:      General: No focal deficit present.      Mental Status: She is alert and oriented to person, place, and time.           DATA    Labs: reviewed in EMR and outside records where pertinent.     PFT DATA on 2023: normal PFT\"s.     CT chest 2015:  1. No appreciable change in the focal micronodular opacities in the right lateral costophrenic angle. This is superimposed over areas of focal bronchiectasis bilaterally with minimal peribronchial cuffing as noted above and pulmonary nodules which are stable.     CT chest 8/25/2023:  Scattered patchy areas of airspace consolidation involving all lobes suggestive of areas of multifocal PNA. There are superimposed areas of bronchiolitis in the periphery of both lungs worst in the lung bases. There is a small left pleural effusion and " trace right pleural effusion.    CT chest on 10/31/2023  Interval increased RLL peribronchovascular consolidative opacity and centrilobular nodules which my reflect infectious / inflammatory process. Findings can be seen in organizing pneumonia. Interval decreased scattered other areas of multifocal patchy opacities seen on the piror exam Similar consolidative opacities in the RUL, RML, and lingula. Stable prominent mediastina LN which my be reactive.     CT chest 2/9/2024:  1.  Marked improvement in the consolidating opacities in the right middle lobe, lingula and right lower lobe as noted above.  2.  Bronchiectasis, predominantly cylindrical with more focal cystic changes in the right lower lobe again noted. Scattered, mild areas of retained endobronchial secretions noted in both costophrenic angles posterolaterally, right greater than left.   Retained secretions  are not hyperdense and distribution not typical for allergic bronchopulmonary aspergillosis, but this should be considered.  3.  Pulmonary findings have clearly progressed since 2015.    XR Swallow Evaluation 3/27/24:  Swallow study with Speech Pathology using multiple barium thicknesses. Normal swallowing given age. No episodes of penetration or aspiration. Patient was also observed in the AP projection while swallowing thin barium. This passed readily into the stomach. Few tertiary contractions are noted.    CXR 3/27/24:  Stable size of cardiomediastinal silhouette. No focal airspace consolidation, pleural effusion or pneumothorax. Known bronchiectasis and small pulmonary nodules better seen on recent CT.     Assessment/Plan:   Janay Rodriguez is a 83 year old female, non-smoker, with history of breast cancer status postlumpectomy, chemo and radiation back in 1984, and history of pneumonia back on August 2023 for which patient received 2 courses of antibiotics.  Imaging studies revealed bilateral patchy opacities.  This was done on a CT scan done on  "8/25/2023.  This was followed by another CT scan of the chest to ensure resolution that was done on 10/31/2023.  Her latest chest CT scan of the chest done on 10/31/2023 showed interval decrease in some areas of multifocal patchy opacities whereas others increased.  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 Of note, her swallow evaluation was unremarkable and she was unable to bring up 3 sputum's for AFB's.    Recommendations:  Expectant management.  The patient is generally quite well.  I recommended that I see her back in a year but that if she has any further episodes of \" pneumonia\" that she should contact the clinic and I will see her sooner than that.    Prua Franklin MD  Pulmonary and Critical Care  P) [phone]          "

## 2024-05-31 NOTE — TELEPHONE ENCOUNTER
[facility] Call Center    Phone Message    May a detailed message be left on voicemail: yes     Reason for Call: Medication Question or concern regarding medication   Prescription Clarification    Name of Medication:   predniSONE (DELTASONE) 10 MG tablet     Prescribing Provider: Lupe     Pharmacy: n/a     What on the order needs clarification? Patient state she was seen today at the clinic. Patient states looking at the paperwork she was given. Patient states the medication list shows she is taking the medication. Patient states she had stopped taking the medication back in February. Patient states she is wondering if she is needing to still take the medication since it is on the medication list. Patient states she feels like she doesn't need to take it again. Patient is wanting to get a call back at confidential line and able to leave a detailed message if not answered. Please advise.         Action Taken: Message routed to:  [facility] ([facility] Lung    Travel Screening: Not Applicable     Date of Service:

## 2024-05-31 NOTE — TELEPHONE ENCOUNTER
Spoke with Janay. She is no longer taking the prednisone and wanted it removed from her list. Per Dr. Andrade's notes on 2/2724:  she had been on prednisone and completed her course of 10 mg.     Will remove this from her list as ordered.

## 2024-09-27 ENCOUNTER — LAB REQUISITION (OUTPATIENT)
Dept: LAB | Facility: CLINIC | Age: 84
End: 2024-09-27

## 2024-09-27 DIAGNOSIS — E55.9 VITAMIN D DEFICIENCY, UNSPECIFIED: ICD-10-CM

## 2024-09-27 PROCEDURE — 82306 VITAMIN D 25 HYDROXY: CPT | Performed by: PHYSICIAN ASSISTANT

## 2024-09-28 LAB — VIT D+METAB SERPL-MCNC: 46 NG/ML (ref 20–50)

## 2024-12-11 ENCOUNTER — LAB REQUISITION (OUTPATIENT)
Dept: LAB | Facility: CLINIC | Age: 84
End: 2024-12-11

## 2024-12-11 PROCEDURE — 87070 CULTURE OTHR SPECIMN AEROBIC: CPT

## 2024-12-12 LAB — BACTERIA WND CULT: NORMAL

## 2025-05-20 ENCOUNTER — OFFICE VISIT (OUTPATIENT)
Dept: PULMONOLOGY | Facility: CLINIC | Age: 85
End: 2025-05-20
Attending: INTERNAL MEDICINE
Payer: COMMERCIAL

## 2025-05-20 VITALS
HEART RATE: 82 BPM | OXYGEN SATURATION: 98 % | DIASTOLIC BLOOD PRESSURE: 78 MMHG | SYSTOLIC BLOOD PRESSURE: 124 MMHG | WEIGHT: 124.8 LBS | BODY MASS INDEX: 19.12 KG/M2

## 2025-05-20 DIAGNOSIS — J47.9 BRONCHIECTASIS WITHOUT ACUTE EXACERBATION (H): Primary | ICD-10-CM

## 2025-05-20 PROCEDURE — 3074F SYST BP LT 130 MM HG: CPT | Performed by: INTERNAL MEDICINE

## 2025-05-20 PROCEDURE — 3078F DIAST BP <80 MM HG: CPT | Performed by: INTERNAL MEDICINE

## 2025-05-20 PROCEDURE — 99213 OFFICE O/P EST LOW 20 MIN: CPT | Performed by: INTERNAL MEDICINE

## 2025-05-20 NOTE — PROGRESS NOTES
CCx: Follow-up of cough    History:     HPI: Ms. Rodriguez is a 85 year old lady, former smoker, with history of lung nodule who was referred here for evaluation of cough and abnormal CT scan of the chest.  She has previously followed with Dr. Andrade after had pneumonia and since her last visit with me, denies fevers, chills, chest pain, chest tightness or shortness of breath.  She is very active and plays softball, tennis, golfs regularly and is walking her dogs daily.    Pertient Medical History:   has been having a cough for some time.  She was tested for COVID and flu and these were negative.  She underwent a chest x-ray on 2023 that was suggestive of multifocal pneumonia.  There was also left pleural effusion and trace right pleural effusion.  She was treated with Augmentin and azithromycin however cough persisted.  Patient had another course of antibiotics but her radiographic findings of infiltrates persisted. Patient was then referred here for further evaluation.  At the last clinic visit she had been offered to undergo bronchoscopy given that she completed a prednisone taper for presumed cryptogenic organizing pneumonia but declined this.    PMHx/PSHx:  H/o breast cancer s/p lumpectomy with axillary lymph node dissection in  s/p chemo and XT  Allergic rhinitis  Hyperlipidemia  Vitamin D deficiency  Benign pulmonary lesion  Plantar fasciitis  Tonsillectomy  Cataract surgery  Left knee arthroscopy  Tubal ligation    Family history: Father  at the age of 66.  He had coronary disease and underwent CABG.  He also had esophagus cancer.  Mother  at the age of 99 and had colon cancer.    Social Hx:   Tobacco: smoked only 1 year in her life.  Occupational exposures: used to work at [employer]. Was an .  Travel: no recent travel. Was in [place], played in [employer], on mid-October of this year () for 4 days.  Pets: takes care of a dog but does not own it.    Review of Systems -  "10 point review of system negative except for what is mentioned in the HPI.    Medications:  Current Outpatient Medications   Medication Sig Dispense Refill    Calcium Carb-Cholecalciferol (CALCIUM 600+D3 PO) Take by mouth daily      MAGNESIUM PO Take 400 mg by mouth daily      Multiple Vitamins-Minerals (CENTRUM SILVER PO) Take by mouth daily      Multiple Vitamins-Minerals (PRESERVISION AREDS 2 PO) Take by mouth daily      Omega-3 Fatty Acids (FISH OIL) 1200 MG capsule Take 1,200 mg by mouth daily      TURMERIC PO Take by mouth daily         Exam/Data:   There were no vitals taken for this visit.    EXAM:  Physical Exam  HENT:      Head: Normocephalic.   Cardiovascular:      Heart sounds: Normal heart sounds.   Pulmonary:      Effort: Pulmonary effort is normal.      Breath sounds: Normal breath sounds.   Abdominal:      General: Abdomen is flat.      Palpations: Abdomen is soft.   Skin:     General: Skin is warm.   Neurological:      General: No focal deficit present.      Mental Status: She is alert and oriented to person, place, and time.           DATA    Labs: reviewed in EMR and outside records where pertinent.     PFT DATA on 2023: normal PFT\"s.     CT chest 2015:  1. No appreciable change in the focal micronodular opacities in the right lateral costophrenic angle. This is superimposed over areas of focal bronchiectasis bilaterally with minimal peribronchial cuffing as noted above and pulmonary nodules which are stable.     CT chest 8/25/2023:  Scattered patchy areas of airspace consolidation involving all lobes suggestive of areas of multifocal PNA. There are superimposed areas of bronchiolitis in the periphery of both lungs worst in the lung bases. There is a small left pleural effusion and trace right pleural effusion.    CT chest on 10/31/2023  Interval increased RLL peribronchovascular consolidative opacity and centrilobular nodules which my reflect infectious / inflammatory process. Findings can be " seen in organizing pneumonia. Interval decreased scattered other areas of multifocal patchy opacities seen on the piror exam Similar consolidative opacities in the RUL, RML, and lingula. Stable prominent mediastina LN which my be reactive.     CT chest 2/9/2024:  1.  Marked improvement in the consolidating opacities in the right middle lobe, lingula and right lower lobe as noted above.  2.  Bronchiectasis, predominantly cylindrical with more focal cystic changes in the right lower lobe again noted. Scattered, mild areas of retained endobronchial secretions noted in both costophrenic angles posterolaterally, right greater than left.   Retained secretions  are not hyperdense and distribution not typical for allergic bronchopulmonary aspergillosis, but this should be considered.  3.  Pulmonary findings have clearly progressed since 2015.    XR Swallow Evaluation 3/27/24:  Swallow study with Speech Pathology using multiple barium thicknesses. Normal swallowing given age. No episodes of penetration or aspiration. Patient was also observed in the AP projection while swallowing thin barium. This passed readily into the stomach. Few tertiary contractions are noted.    CXR 3/27/24:  Stable size of cardiomediastinal silhouette. No focal airspace consolidation, pleural effusion or pneumothorax. Known bronchiectasis and small pulmonary nodules better seen on recent CT.     Assessment/Plan:   Janay Rodriguez is a 83 year old female, non-smoker, with history of breast cancer status postlumpectomy, chemo and radiation back in 1984, and history of pneumonia back on August 2023 for which patient received 2 courses of antibiotics.  Imaging studies revealed bilateral patchy opacities.  This was done on a CT scan done on 8/25/2023.  This was followed by another CT scan of the chest to ensure resolution that was done on 10/31/2023.  Her latest chest CT scan of the chest done on 10/31/2023 showed interval decrease in some areas of  multifocal patchy opacities whereas others increased.  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 Of note, her swallow evaluation was unremarkable and she was unable to bring up 3 sputum's for AFB's.    Recommendations:  Expectant management.  The patient is generally quite well.  No further need to follow-up in our clinic.    Pura Franklin MD  Pulmonary and Critical Care  [facility]

## 2025-06-17 ENCOUNTER — LAB REQUISITION (OUTPATIENT)
Dept: LAB | Facility: CLINIC | Age: 85
End: 2025-06-17

## 2025-06-17 DIAGNOSIS — E78.2 MIXED HYPERLIPIDEMIA: ICD-10-CM

## 2025-06-17 DIAGNOSIS — E55.9 VITAMIN D DEFICIENCY, UNSPECIFIED: ICD-10-CM

## 2025-06-17 PROCEDURE — 80061 LIPID PANEL: CPT | Performed by: PHYSICIAN ASSISTANT

## 2025-06-17 PROCEDURE — 82306 VITAMIN D 25 HYDROXY: CPT | Performed by: PHYSICIAN ASSISTANT

## 2025-06-17 PROCEDURE — 80053 COMPREHEN METABOLIC PANEL: CPT | Performed by: PHYSICIAN ASSISTANT

## 2025-06-18 LAB
ALBUMIN SERPL BCG-MCNC: 4.7 G/DL (ref 3.5–5.2)
ALP SERPL-CCNC: 94 U/L (ref 40–150)
ALT SERPL W P-5'-P-CCNC: 16 U/L (ref 0–50)
ANION GAP SERPL CALCULATED.3IONS-SCNC: 13 MMOL/L (ref 7–15)
AST SERPL W P-5'-P-CCNC: 25 U/L (ref 0–45)
BILIRUB SERPL-MCNC: 0.4 MG/DL
BUN SERPL-MCNC: 18.2 MG/DL (ref 8–23)
CALCIUM SERPL-MCNC: 9.8 MG/DL (ref 8.8–10.4)
CHLORIDE SERPL-SCNC: 102 MMOL/L (ref 98–107)
CHOLEST SERPL-MCNC: 270 MG/DL
CREAT SERPL-MCNC: 0.82 MG/DL (ref 0.51–0.95)
EGFRCR SERPLBLD CKD-EPI 2021: 71 ML/MIN/1.73M2
FASTING STATUS PATIENT QL REPORTED: NO
FASTING STATUS PATIENT QL REPORTED: NO
GLUCOSE SERPL-MCNC: 93 MG/DL (ref 70–99)
HCO3 SERPL-SCNC: 24 MMOL/L (ref 22–29)
HDLC SERPL-MCNC: 98 MG/DL
LDLC SERPL CALC-MCNC: 159 MG/DL
NONHDLC SERPL-MCNC: 172 MG/DL
POTASSIUM SERPL-SCNC: 4.5 MMOL/L (ref 3.4–5.3)
PROT SERPL-MCNC: 7.2 G/DL (ref 6.4–8.3)
SODIUM SERPL-SCNC: 139 MMOL/L (ref 135–145)
TRIGL SERPL-MCNC: 63 MG/DL
VIT D+METAB SERPL-MCNC: 51 NG/ML (ref 20–50)

## 2025-07-02 ENCOUNTER — TRANSFERRED RECORDS (OUTPATIENT)
Dept: HEALTH INFORMATION MANAGEMENT | Facility: CLINIC | Age: 85
End: 2025-07-02

## 2025-07-25 ENCOUNTER — TRANSFERRED RECORDS (OUTPATIENT)
Dept: HEALTH INFORMATION MANAGEMENT | Facility: CLINIC | Age: 85
End: 2025-07-25
Payer: COMMERCIAL

## 2025-08-13 ENCOUNTER — MEDICAL CORRESPONDENCE (OUTPATIENT)
Dept: HEALTH INFORMATION MANAGEMENT | Facility: CLINIC | Age: 85
End: 2025-08-13
Payer: COMMERCIAL

## 2025-08-13 ENCOUNTER — TRANSFERRED RECORDS (OUTPATIENT)
Dept: HEALTH INFORMATION MANAGEMENT | Facility: CLINIC | Age: 85
End: 2025-08-13
Payer: COMMERCIAL

## 2025-08-13 ENCOUNTER — TRANSCRIBE ORDERS (OUTPATIENT)
Dept: OTHER | Age: 85
End: 2025-08-13

## 2025-08-13 DIAGNOSIS — I47.10 SVT (SUPRAVENTRICULAR TACHYCARDIA): Primary | ICD-10-CM
